# Patient Record
Sex: FEMALE | HISPANIC OR LATINO | Employment: UNEMPLOYED | URBAN - METROPOLITAN AREA
[De-identification: names, ages, dates, MRNs, and addresses within clinical notes are randomized per-mention and may not be internally consistent; named-entity substitution may affect disease eponyms.]

---

## 2017-04-17 ENCOUNTER — HOSPITAL ENCOUNTER (OUTPATIENT)
Facility: MEDICAL CENTER | Age: 42
End: 2017-04-17
Attending: SPECIALIST
Payer: COMMERCIAL

## 2017-04-17 PROCEDURE — 88305 TISSUE EXAM BY PATHOLOGIST: CPT

## 2023-09-04 ENCOUNTER — APPOINTMENT (OUTPATIENT)
Dept: RADIOLOGY | Facility: MEDICAL CENTER | Age: 48
End: 2023-09-04
Attending: EMERGENCY MEDICINE

## 2023-09-04 ENCOUNTER — HOSPITAL ENCOUNTER (EMERGENCY)
Facility: MEDICAL CENTER | Age: 48
End: 2023-09-04
Attending: EMERGENCY MEDICINE

## 2023-09-04 VITALS
RESPIRATION RATE: 16 BRPM | HEIGHT: 65 IN | BODY MASS INDEX: 28.36 KG/M2 | WEIGHT: 170.19 LBS | OXYGEN SATURATION: 97 % | HEART RATE: 88 BPM | DIASTOLIC BLOOD PRESSURE: 96 MMHG | SYSTOLIC BLOOD PRESSURE: 144 MMHG | TEMPERATURE: 98.5 F

## 2023-09-04 DIAGNOSIS — B96.89 BACTERIAL VAGINOSIS: ICD-10-CM

## 2023-09-04 DIAGNOSIS — N76.0 BACTERIAL VAGINOSIS: ICD-10-CM

## 2023-09-04 DIAGNOSIS — N39.0 URINARY TRACT INFECTION WITHOUT HEMATURIA, SITE UNSPECIFIED: ICD-10-CM

## 2023-09-04 LAB
ALBUMIN SERPL BCP-MCNC: 4.8 G/DL (ref 3.2–4.9)
ALBUMIN/GLOB SERPL: 1.5 G/DL
ALP SERPL-CCNC: 107 U/L (ref 30–99)
ALT SERPL-CCNC: 18 U/L (ref 2–50)
ANION GAP SERPL CALC-SCNC: 14 MMOL/L (ref 7–16)
APPEARANCE UR: ABNORMAL
AST SERPL-CCNC: 19 U/L (ref 12–45)
BACTERIA #/AREA URNS HPF: ABNORMAL /HPF
BASOPHILS # BLD AUTO: 0.5 % (ref 0–1.8)
BASOPHILS # BLD: 0.05 K/UL (ref 0–0.12)
BILIRUB SERPL-MCNC: 0.7 MG/DL (ref 0.1–1.5)
BILIRUB UR QL STRIP.AUTO: NEGATIVE
BUN SERPL-MCNC: 13 MG/DL (ref 8–22)
C TRACH DNA GENITAL QL NAA+PROBE: NEGATIVE
CALCIUM ALBUM COR SERPL-MCNC: 9 MG/DL (ref 8.5–10.5)
CALCIUM SERPL-MCNC: 9.6 MG/DL (ref 8.5–10.5)
CANDIDA DNA VAG QL PROBE+SIG AMP: NEGATIVE
CHLORIDE SERPL-SCNC: 101 MMOL/L (ref 96–112)
CO2 SERPL-SCNC: 24 MMOL/L (ref 20–33)
COLOR UR: YELLOW
CREAT SERPL-MCNC: 0.71 MG/DL (ref 0.5–1.4)
EOSINOPHIL # BLD AUTO: 0.11 K/UL (ref 0–0.51)
EOSINOPHIL NFR BLD: 1 % (ref 0–6.9)
EPI CELLS #/AREA URNS HPF: ABNORMAL /HPF
ERYTHROCYTE [DISTWIDTH] IN BLOOD BY AUTOMATED COUNT: 39.5 FL (ref 35.9–50)
G VAGINALIS DNA VAG QL PROBE+SIG AMP: POSITIVE
GFR SERPLBLD CREATININE-BSD FMLA CKD-EPI: 106 ML/MIN/1.73 M 2
GLOBULIN SER CALC-MCNC: 3.3 G/DL (ref 1.9–3.5)
GLUCOSE SERPL-MCNC: 112 MG/DL (ref 65–99)
GLUCOSE UR STRIP.AUTO-MCNC: NEGATIVE MG/DL
HCG SERPL QL: NEGATIVE
HCT VFR BLD AUTO: 46.4 % (ref 37–47)
HGB BLD-MCNC: 15.7 G/DL (ref 12–16)
HIV 1+2 AB+HIV1 P24 AG SERPL QL IA: NORMAL
HYALINE CASTS #/AREA URNS LPF: ABNORMAL /LPF
IMM GRANULOCYTES # BLD AUTO: 0.03 K/UL (ref 0–0.11)
IMM GRANULOCYTES NFR BLD AUTO: 0.3 % (ref 0–0.9)
KETONES UR STRIP.AUTO-MCNC: NEGATIVE MG/DL
LEUKOCYTE ESTERASE UR QL STRIP.AUTO: ABNORMAL
LIPASE SERPL-CCNC: 16 U/L (ref 11–82)
LYMPHOCYTES # BLD AUTO: 2.2 K/UL (ref 1–4.8)
LYMPHOCYTES NFR BLD: 19.9 % (ref 22–41)
MCH RBC QN AUTO: 31.3 PG (ref 27–33)
MCHC RBC AUTO-ENTMCNC: 33.8 G/DL (ref 32.2–35.5)
MCV RBC AUTO: 92.6 FL (ref 81.4–97.8)
MICRO URNS: ABNORMAL
MONOCYTES # BLD AUTO: 0.67 K/UL (ref 0–0.85)
MONOCYTES NFR BLD AUTO: 6.1 % (ref 0–13.4)
N GONORRHOEA DNA GENITAL QL NAA+PROBE: NEGATIVE
NEUTROPHILS # BLD AUTO: 7.97 K/UL (ref 1.82–7.42)
NEUTROPHILS NFR BLD: 72.2 % (ref 44–72)
NITRITE UR QL STRIP.AUTO: NEGATIVE
NRBC # BLD AUTO: 0 K/UL
NRBC BLD-RTO: 0 /100 WBC (ref 0–0.2)
PH UR STRIP.AUTO: 5.5 [PH] (ref 5–8)
PLATELET # BLD AUTO: 371 K/UL (ref 164–446)
PMV BLD AUTO: 9.5 FL (ref 9–12.9)
POTASSIUM SERPL-SCNC: 3.9 MMOL/L (ref 3.6–5.5)
PROT SERPL-MCNC: 8.1 G/DL (ref 6–8.2)
PROT UR QL STRIP: 100 MG/DL
RBC # BLD AUTO: 5.01 M/UL (ref 4.2–5.4)
RBC # URNS HPF: ABNORMAL /HPF
RBC UR QL AUTO: ABNORMAL
SODIUM SERPL-SCNC: 139 MMOL/L (ref 135–145)
SP GR UR STRIP.AUTO: 1.01
SPECIMEN SOURCE: NORMAL
T PALLIDUM AB SER QL IA: NORMAL
T VAGINALIS DNA VAG QL PROBE+SIG AMP: NEGATIVE
UROBILINOGEN UR STRIP.AUTO-MCNC: 1 MG/DL
WBC # BLD AUTO: 11 K/UL (ref 4.8–10.8)
WBC #/AREA URNS HPF: ABNORMAL /HPF

## 2023-09-04 PROCEDURE — 85025 COMPLETE CBC W/AUTO DIFF WBC: CPT

## 2023-09-04 PROCEDURE — 83690 ASSAY OF LIPASE: CPT

## 2023-09-04 PROCEDURE — 84703 CHORIONIC GONADOTROPIN ASSAY: CPT

## 2023-09-04 PROCEDURE — 87510 GARDNER VAG DNA DIR PROBE: CPT

## 2023-09-04 PROCEDURE — 87491 CHLMYD TRACH DNA AMP PROBE: CPT

## 2023-09-04 PROCEDURE — 96374 THER/PROPH/DIAG INJ IV PUSH: CPT

## 2023-09-04 PROCEDURE — 87086 URINE CULTURE/COLONY COUNT: CPT

## 2023-09-04 PROCEDURE — 87480 CANDIDA DNA DIR PROBE: CPT

## 2023-09-04 PROCEDURE — 87186 SC STD MICRODIL/AGAR DIL: CPT

## 2023-09-04 PROCEDURE — 36415 COLL VENOUS BLD VENIPUNCTURE: CPT

## 2023-09-04 PROCEDURE — 700111 HCHG RX REV CODE 636 W/ 250 OVERRIDE (IP): Performed by: EMERGENCY MEDICINE

## 2023-09-04 PROCEDURE — 87077 CULTURE AEROBIC IDENTIFY: CPT

## 2023-09-04 PROCEDURE — 96375 TX/PRO/DX INJ NEW DRUG ADDON: CPT

## 2023-09-04 PROCEDURE — 700102 HCHG RX REV CODE 250 W/ 637 OVERRIDE(OP): Performed by: EMERGENCY MEDICINE

## 2023-09-04 PROCEDURE — 87389 HIV-1 AG W/HIV-1&-2 AB AG IA: CPT

## 2023-09-04 PROCEDURE — 99285 EMERGENCY DEPT VISIT HI MDM: CPT

## 2023-09-04 PROCEDURE — 87591 N.GONORRHOEAE DNA AMP PROB: CPT

## 2023-09-04 PROCEDURE — 76830 TRANSVAGINAL US NON-OB: CPT

## 2023-09-04 PROCEDURE — 81001 URINALYSIS AUTO W/SCOPE: CPT

## 2023-09-04 PROCEDURE — 87660 TRICHOMONAS VAGIN DIR PROBE: CPT

## 2023-09-04 PROCEDURE — 86780 TREPONEMA PALLIDUM: CPT

## 2023-09-04 PROCEDURE — 80053 COMPREHEN METABOLIC PANEL: CPT

## 2023-09-04 PROCEDURE — A9270 NON-COVERED ITEM OR SERVICE: HCPCS | Performed by: EMERGENCY MEDICINE

## 2023-09-04 RX ORDER — KETOROLAC TROMETHAMINE 30 MG/ML
15 INJECTION, SOLUTION INTRAMUSCULAR; INTRAVENOUS ONCE
Status: COMPLETED | OUTPATIENT
Start: 2023-09-04 | End: 2023-09-04

## 2023-09-04 RX ORDER — CEFTRIAXONE 1 G/1
1000 INJECTION, POWDER, FOR SOLUTION INTRAMUSCULAR; INTRAVENOUS ONCE
Status: COMPLETED | OUTPATIENT
Start: 2023-09-04 | End: 2023-09-04

## 2023-09-04 RX ORDER — METRONIDAZOLE 500 MG/1
500 TABLET ORAL ONCE
Status: COMPLETED | OUTPATIENT
Start: 2023-09-04 | End: 2023-09-04

## 2023-09-04 RX ORDER — CEFDINIR 300 MG/1
300 CAPSULE ORAL 2 TIMES DAILY
Qty: 20 CAPSULE | Refills: 0 | Status: ACTIVE | OUTPATIENT
Start: 2023-09-04 | End: 2023-09-11

## 2023-09-04 RX ORDER — METRONIDAZOLE 500 MG/1
500 TABLET ORAL 2 TIMES DAILY
Qty: 14 TABLET | Refills: 0 | Status: ACTIVE | OUTPATIENT
Start: 2023-09-04 | End: 2023-09-11

## 2023-09-04 RX ADMIN — CEFTRIAXONE SODIUM 1000 MG: 1 INJECTION, POWDER, FOR SOLUTION INTRAMUSCULAR; INTRAVENOUS at 11:27

## 2023-09-04 RX ADMIN — METRONIDAZOLE 500 MG: 500 TABLET ORAL at 11:59

## 2023-09-04 RX ADMIN — KETOROLAC TROMETHAMINE 15 MG: 30 INJECTION, SOLUTION INTRAMUSCULAR; INTRAVENOUS at 10:58

## 2023-09-04 ASSESSMENT — PAIN DESCRIPTION - DESCRIPTORS: DESCRIPTORS: BURNING;THROBBING

## 2023-09-04 NOTE — LETTER
9/12/2023               Rosy Choudhary  [address]  [place] [address]        Dear Rosy (MR#[id number])    As we have been unable to contact you by phone, this letter is sent in regards to your recent visit to the [facility] Emergency Department on 9/4/2023. During the visit, tests were performed to assist the physician in a medical diagnosis. A review of those tests requires that we notify you of the following:    Your urine culture and sensitivity was POSITIVE for a bacteria called Escherichia coli, and further treatment may be necessary. The currently prescribed antibiotic (cefdinir) may not be effective in treating your infection. PLEASE STOP TAKING THE CEFDINIR. I HAVE CALLED IN A NEW PRESCRIPTION FOR AN ANTIBIOTIC CALLED NITROFURANTOIN THAT YOU SHOULD TAKE INSTEAD. THIS HAS BEEN CALLED INTO THE [facility] ON [address]      Thank you for your cooperation in the matter.    Sincerely,  ED Culture Follow-Up Staff  Mariel Blanton, PharmD    Formerly [facility], Emergency Department  [address] [place] [id number] [phone] (ED Culture Line)

## 2023-09-04 NOTE — ED TRIAGE NOTES
"Chief Complaint   Patient presents with    Abdominal Pain     Pt here for low abd pain radiates to pelvic area, extends to R side and low back     Painful Urination     Pain w/ urination w/ abd/pelvic pain starting one week ago      BP (!) 149/104   Pulse 82   Temp 36.5 °C (97.7 °F) (Temporal)   Resp 18   Ht 1.651 m (5' 5\")   Wt 77.2 kg (170 lb 3.1 oz)   SpO2 97%   BMI 28.32 kg/m²     Pt here for multiple cc  Low abd pain, pelvic pain, RLQ pain, and R flank pain starting a week ago. Pt also experiencing urinary s/s w/ pain and burning  Pt opted for STI testing to just get checked  Pt has IUD in and denies possibility of being pregnant    Pt has hx of kidney stones as well    STI and abd pain protocol ordered    Pt to phleb for lab draw and provided urine cup w/ clean catch instructions   "

## 2023-09-04 NOTE — ED NOTES
Pt sitting upright in bed in no obvious distress at this time. Discharge information and instructions given/discussed with Pt. Pt acknowledged information. Pt self ambulated to [facility] without difficulty for her ride.

## 2023-09-04 NOTE — ED NOTES
Patient medicated per MAR with assisting RN. Allergies confirmed with patient prior to administration.

## 2023-09-04 NOTE — ED NOTES
Patient stated that pain has gone down since medication was administered and rated it a 6/10 now. Patient denied the need for anything at this time.

## 2023-09-04 NOTE — ED NOTES
Patient ambulated with steady gait and stand by assistance from lobby to assigned room. Patient put on monitor (blood pressure and pulse ox) and placed in position of comfort. Call light placed within reach of patient. Agree with triage note. Chart up for ERP.

## 2023-09-04 NOTE — ED PROVIDER NOTES
ED Provider Note    Scribed for Noa Santana M.D. by Kendal Tan. 9/4/2023, 10:06 AM.    Primary care provider: Pcp Pt States None  Means of arrival: Walk-in  History obtained from: Patient  History limited by: None    CHIEF COMPLAINT  Chief Complaint   Patient presents with    Abdominal Pain     Pt here for low abd pain radiates to pelvic area, extends to R side and low back     Painful Urination     Pain w/ urination w/ abd/pelvic pain starting one week ago      HPI/ROS  Rosy Choudhary is a 47 y.o. female who presents to the Emergency Department for low abdominal pain onset a week ago.  Patient reports that she started to have suprapubic discomfort and dysuria about 1 week ago, pain started to radiate into her bilateral back and therefore she came in for further evaluation.  She also notes having vaginal discharge that started a couple of days ago.  She states that she is having foul-smelling discharge.  She is in a monogamous sexual relationship and has low suspicion for STD but is concerned about her vaginal discharge.  Is amenable to STD testing at this time.  No alleviating or exacerbating factors noted.    EXTERNAL RECORDS REVIEWED  None pertinent     LIMITATION TO HISTORY   Select: : None    OUTSIDE HISTORIAN(S):  None      PAST MEDICAL HISTORY   has a past medical history of Renal stone.    SURGICAL HISTORY   has a past surgical history that includes other abdominal surgery.    SOCIAL HISTORY  Social History     Tobacco Use    Smoking status: Never   Substance Use Topics    Alcohol use: No    Drug use: No      Social History     Substance and Sexual Activity   Drug Use No     FAMILY HISTORY  History reviewed. No pertinent family history.    CURRENT MEDICATIONS  Home Medications       Reviewed by Vilma Stokes R.N. (Registered Nurse) on 09/04/23 at 0935  Med List Status: Partial     Medication Last Dose Status   calcium polycarbophil (FIBERCON) 625 MG Tab  Active   meloxicam (MOBIC) 7.5 MG  "Tab  Active   oxycodone immediate-release (ROXICODONE) 5 MG Tab  Active                  ALLERGIES  No Known Allergies    PHYSICAL EXAM  VITAL SIGNS: BP (!) 149/104   Pulse 82   Temp 36.5 °C (97.7 °F) (Temporal)   Resp 18   Ht 1.651 m (5' 5\")   Wt 77.2 kg (170 lb 3.1 oz)   SpO2 97%   BMI 28.32 kg/m²   Vitals reviewed by myself.  Physical Exam  Nursing note and vitals reviewed.  Constitutional: Well-developed and well-nourished. No acute distress.   HENT: Head is normocephalic and atraumatic.  Eyes: extra-ocular movements intact  Cardiovascular: Regular rate and regular rhythm. No murmur heard.  Pulmonary/Chest: Breath sounds normal. No wheezes or rales.   Abdominal: Soft with mild suprapubic discomfort, no rebound or guarding  Musculoskeletal: Extremities exhibit normal range of motion without edema or tenderness.   Neurological: Awake and alert  Skin: Skin is warm and dry. No rash.   Pelvic: IUD strings visualized. Thin, white discharge from cervical os.    DIAGNOSTIC STUDIES:  LABS  Labs Reviewed   CBC WITH DIFFERENTIAL - Abnormal; Notable for the following components:       Result Value    WBC 11.0 (*)     Neutrophils-Polys 72.20 (*)     Lymphocytes 19.90 (*)     Neutrophils (Absolute) 7.97 (*)     All other components within normal limits   COMP METABOLIC PANEL - Abnormal; Notable for the following components:    Glucose 112 (*)     Alkaline Phosphatase 107 (*)     All other components within normal limits   URINALYSIS,CULTURE IF INDICATED - Abnormal; Notable for the following components:    Character Cloudy (*)     Protein 100 (*)     Leukocyte Esterase Large (*)     Occult Blood Moderate (*)     All other components within normal limits    Narrative:     Indication for culture:->Patient WITHOUT an indwelling Sommer  catheter in place with new onset of Dysuria, Frequency,  Urgency, and/or Suprapubic pain  Release to patient->Immediate   URINE MICROSCOPIC (W/UA) - Abnormal; Notable for the following " components:    WBC Packed (*)     RBC 2-5 (*)     Bacteria Many (*)     All other components within normal limits    Narrative:     Indication for culture:->Patient WITHOUT an indwelling Sommer  catheter in place with new onset of Dysuria, Frequency,  Urgency, and/or Suprapubic pain  Release to patient->Immediate   VAGINAL PATHOGENS DNA PANEL - Abnormal; Notable for the following components:    Gardnerella vaginalis DNA Probe POSITIVE (*)     All other components within normal limits    Narrative:     Release to patient->Immediate   T.PALLIDUM AB INO (SCREENING)   HIV AG/AB COMBO ASSAY SCREENING   LIPASE   HCG QUAL SERUM   ESTIMATED GFR   CHLAMYDIA/GC, PCR (GENITAL/ANAL SWAB)   URINE CULTURE(NEW)    Narrative:     Indication for culture:->Patient WITHOUT an indwelling Sommer  catheter in place with new onset of Dysuria, Frequency,  Urgency, and/or Suprapubic pain  Release to patient->Immediate     All labs reviewed and independently interpreted by myself    RADIOLOGY  Final interpretation by radiology demonstrates:  US-PELVIC COMPLETE (TRANSABDOMINAL/TRANSVAGINAL) (COMBO)   Final Result      1.  Unremarkable transvaginal appearance of the pelvis with IUD in place.        The radiologist's interpretation of all radiological studies have been reviewed by me.      COURSE & MEDICAL DECISION MAKING    ED Observation Status? Yes; I am placing the patient in to an observation status due to a diagnostic uncertainty as well as therapeutic intensity. Patient placed in observation status at 10:09 AM, 9/4/2023.     Observation plan is as follows: Monitor for symptom management and diagnostic results.    Upon Reevaluation, the patient's condition has: Improved; and will be discharged.    Patient discharged from ED Observation status at 12:59 PM (Time) 9/4/2023 (Date).     INITIAL ASSESSMENT AND PLAN    Patient is a 47-year-old female who presents for evaluation of abdominal discomfort and dysuria with vaginal discharge.   Differential diagnosis includes urinary tract infection, pyelonephritis, bacterial vaginosis.  Diagnostic work-up includes labs, pelvic swabs and pelvic ultrasound.  Low suspicion for STD based on patient's history, will obtain STD testing, however will not empirically treat at this time.       REASSESSMENTS   10:06 AM - Patient seen and examined at bedside. Discussed plan of care, including ordering lab work and a pelvic exam for further evaluation. Patient agrees to the plan of care.     10:21 AM - Pelvic exam performed with female chaperone in the room.       ER COURSE AND FINAL DISPOSITION   Patient's initial vitals are within normal limits except for mild hypertension.  She is treated with Toradol empirically for her pain after pregnancy test returns and is negative.  Labs returned and are unremarkable except for mild leukocytosis of 11.0.  Ultrasound returns and demonstrates no acute findings, IUD in place.  Urinalysis is consistent with infection and vaginal swabs are consistent with bacterial vaginosis.  Therefore patient will be started on Flagyl and cefdinir.  She is given a dose of ceftriaxone in the emergency department.  She is then given strict return precautions and discharged in stable condition.    ADDITIONAL PROBLEM LIST AND RESOURCE UTILIZATION    Additional problems aside from the chief complaint that I have addressed: none    I have discussed management of the patient with the following physicians and GUMARO's: none    Discussion of management with other QHP or appropriate source(s): none     Escalation of care considered, and ultimately not performed: see above.     Barriers to care at this time, including but not limited to: Patient does not have established PCP.     Decision tools and prescription drugs considered including, but not limited to: see above.    Please see review of records as noted above      DISPOSITION:  Patient will be discharged home in stable condition.    FOLLOW UP:  [facility]  [address] [address]  [phone]          OUTPATIENT MEDICATIONS:  New Prescriptions    CEFDINIR (OMNICEF) 300 MG CAP    Take 1 Capsule by mouth 2 times a day.    METRONIDAZOLE (FLAGYL) 500 MG TAB    Take 1 Tablet by mouth 2 times a day for 7 days.     FINAL IMPRESSION  1. Urinary tract infection without hematuria, site unspecified    2. Bacterial vaginosis         Kendal OTT (Scribe), am scribing for, and in the presence of, Noa Santana M.D..    Electronically signed by: Kendal Tan (Scribe), 9/4/2023    INoa M.D. personally performed the services described in this documentation, as scribed by Kendal Tan in my presence, and it is both accurate and complete.    The note accurately reflects work and decisions made by me.  Noa Santana M.D.  9/4/2023  1:01 PM

## 2023-09-06 LAB
BACTERIA UR CULT: ABNORMAL
BACTERIA UR CULT: ABNORMAL
SIGNIFICANT IND 70042: ABNORMAL
SITE SITE: ABNORMAL
SOURCE SOURCE: ABNORMAL

## 2023-09-11 RX ORDER — NITROFURANTOIN 25; 75 MG/1; MG/1
100 CAPSULE ORAL 2 TIMES DAILY
Qty: 10 CAPSULE | Refills: 0 | Status: ACTIVE | OUTPATIENT
Start: 2023-09-11 | End: 2023-09-16

## 2023-09-12 NOTE — ED NOTES
ED Positive Culture Follow-up/Notification Note:    Date: 9/11/23     Patient seen in the ED on 9/4/2023 for lower abdominal pain radiating to pelvis and pain with urination. Also notes having vaginal discharge that is foul smelling.  1. Urinary tract infection without hematuria, site unspecified    2. Bacterial vaginosis       Discharge Medication List as of 9/4/2023 12:55 PM        START taking these medications    Details   metroNIDAZOLE (FLAGYL) 500 MG Tab Take 1 Tablet by mouth 2 times a day for 7 days., Disp-14 Tablet, R-0, Normal      cefdinir (OMNICEF) 300 MG Cap Take 1 Capsule by mouth 2 times a day., Disp-20 Capsule, R-0, Normal             Allergies: Patient has no known allergies.     Vitals:    09/04/23 0950 09/04/23 1020 09/04/23 1100 09/04/23 1105   BP: (!) 159/93 (!) 147/91  (!) 144/96   Pulse: 88 80 79 88   Resp:    16   Temp:    36.9 °C (98.5 °F)   TempSrc:       SpO2: 99% 97% 96% 97%   Weight:       Height:           Final cultures:    09/04/23 10:30   C. trachomatis by PCR Negative   Candida species DNA Probe Negative   Gardnerella vaginalis DNA Probe POSITIVE !   N. gonorrhoeae by PCR Negative   Source Vaginal   Trichamonas vaginalis DNA Probe Negative   !: Data is abnormal  Results       Procedure Component Value Units Date/Time    URINE CULTURE(NEW) [[id number]]  (Abnormal)  (Susceptibility) Collected: 09/04/23 1015    Order Status: Completed Specimen: Urine Updated: 09/06/23 1005     Significant Indicator POS     Source UR     Site -     Culture Result -      Escherichia coli  >100,000 cfu/mL      Narrative:      Indication for culture:->Patient WITHOUT an indwelling Sommer  catheter in place with new onset of Dysuria, Frequency,  Urgency, and/or Suprapubic pain  Release to patient->Immediate    Susceptibility       Escherichia coli (1)       Antibiotic Interpretation Microscan   Method Status    Amikacin  [*]  Sensitive <=16 mcg/mL JONY Final    Ampicillin Resistant >16 mcg/mL JONY Final     Amoxicillin/CA  [*]  Sensitive <=8/4 mcg/mL JONY Final    Aztreonam  [*]  Resistant >16 mcg/mL JONY Final    Ceftriaxone Resistant >32 mcg/mL JONY Final    Ceftazidime  [*]  Resistant >16 mcg/mL JONY Final    Cefazolin Resistant >16 mcg/mL JONY Final     Breakpoints when Cefazolin is used for therapy of infections  other than uncomplicated UTIs due to Enterobacterales are as  follows:  JONY and Interpretation:  <=2 S  4 I  >=8 R         Ciprofloxacin Sensitive <=0.25 mcg/mL JONY Final    Cefepime Resistant >16 mcg/mL JONY Final    Cefuroxime Resistant >16 mcg/mL JONY Final    Ampicillin/sulbactam Intermediate 16/8 mcg/mL JONY Final    Ertapenem  [*]  Sensitive <=0.5 mcg/mL JONY Final    Tobramycin Sensitive 4 mcg/mL JONY Final    Nitrofurantoin Sensitive <=32 mcg/mL JONY Final    Gentamicin Sensitive <=2 mcg/mL JONY Final    Levofloxacin Sensitive <=0.5 mcg/mL JONY Final    Meropenem  [*]  Sensitive <=1 mcg/mL JONY Final    Minocycline Sensitive <=4 mcg/mL JONY Final    Pip/Tazobactam Sensitive <=8 mcg/mL JONY Final    Trimeth/Sulfa Resistant >2/38 mcg/mL JONY Final    Tetracycline  [*]  Resistant >8 mcg/mL JONY Final    Tigecycline Sensitive <=2 mcg/mL JONY Final               [*]  Suppressed Antibiotic                           Plan:   Appropriate antibiotic therapy prescribed to treat bacterial vaginosis. However, UTI shows E. Coli resistant to cefuroxime and ceftriaxone. Cefdinir not likely to be effective to treat this bacteria.  Attempted to contact the patient but at the number listed was told I had the wrong number. Will change the patient's antibiotic therapy to nitrofurantoin 100mg po BID x 5 days. Will send the prescription to [facility] on [address] Will send a letter to the patient to notify of change.    Mariel Blanton, PharmD